# Patient Record
Sex: MALE | Race: WHITE | NOT HISPANIC OR LATINO | Employment: PART TIME | URBAN - NONMETROPOLITAN AREA
[De-identification: names, ages, dates, MRNs, and addresses within clinical notes are randomized per-mention and may not be internally consistent; named-entity substitution may affect disease eponyms.]

---

## 2017-01-18 DIAGNOSIS — I73.9 PVD (PERIPHERAL VASCULAR DISEASE) (HCC): Primary | ICD-10-CM

## 2017-01-19 ENCOUNTER — OFFICE VISIT (OUTPATIENT)
Dept: CARDIAC SURGERY | Facility: CLINIC | Age: 72
End: 2017-01-19

## 2017-01-19 VITALS
DIASTOLIC BLOOD PRESSURE: 60 MMHG | WEIGHT: 221 LBS | TEMPERATURE: 97.4 F | BODY MASS INDEX: 26.91 KG/M2 | OXYGEN SATURATION: 100 % | HEART RATE: 68 BPM | SYSTOLIC BLOOD PRESSURE: 150 MMHG | HEIGHT: 76 IN

## 2017-01-19 DIAGNOSIS — I65.23 BILATERAL CAROTID ARTERY STENOSIS: Primary | ICD-10-CM

## 2017-01-19 PROBLEM — I49.9 ARRHYTHMIA: Status: RESOLVED | Noted: 2017-01-19 | Resolved: 2017-01-19

## 2017-01-19 PROBLEM — E11.9 TYPE 2 DIABETES MELLITUS WITHOUT COMPLICATION (HCC): Status: ACTIVE | Noted: 2017-01-19

## 2017-01-19 PROBLEM — I49.9 ARRHYTHMIA: Status: ACTIVE | Noted: 2017-01-19

## 2017-01-19 PROBLEM — I25.10 CORONARY ARTERY DISEASE INVOLVING NATIVE CORONARY ARTERY WITHOUT ANGINA PECTORIS: Status: ACTIVE | Noted: 2017-01-19

## 2017-01-19 PROCEDURE — 99213 OFFICE O/P EST LOW 20 MIN: CPT | Performed by: NURSE PRACTITIONER

## 2017-01-19 RX ORDER — PIOGLITAZONEHYDROCHLORIDE 30 MG/1
30 TABLET ORAL DAILY
COMMUNITY
End: 2017-07-19 | Stop reason: ALTCHOICE

## 2017-01-19 RX ORDER — PROPAFENONE HYDROCHLORIDE 150 MG/1
150 TABLET, COATED ORAL 3 TIMES DAILY
COMMUNITY

## 2017-01-19 RX ORDER — PANTOPRAZOLE SODIUM 40 MG/1
40 TABLET, DELAYED RELEASE ORAL DAILY
COMMUNITY

## 2017-01-19 NOTE — MR AVS SNAPSHOT
Esteban Ferreira   1/19/2017 2:00 PM   Office Visit    Dept Phone:  [phone]   Encounter #:  [id number]    Provider:  LAM Stiles   Department:  [facility] CARDIOTHORACIC AND VASCULAR SURGERY                Your Full Care Plan              Today's Medication Changes          These changes are accurate as of: 1/19/17  2:48 PM.  If you have any questions, ask your nurse or doctor.               Stop taking medication(s)listed here:     amiodarone 200 MG tablet   Commonly known as:  PACERONE   Stopped by:  LAM Stiles           apixaban 5 MG tablet tablet   Commonly known as:  ELIQUIS   Stopped by:  LAM Stiles           digoxin 125 MCG tablet   Commonly known as:  LANOXIN   Stopped by:  LAM Stiles           losartan-hydrochlorothiazide 50-12.5 MG per tablet   Commonly known as:  HYZAAR   Stopped by:  LAM Stiles                      Your Updated Medication List          This list is accurate as of: 1/19/17  2:48 PM.  Always use your most recent med list.                aspirin 81 MG chewable tablet       diltiaZEM 120 MG tablet   Commonly known as:  CARDIZEM       glipiZIDE 10 MG tablet   Commonly known as:  GLUCOTROL       metFORMIN 500 MG tablet   Commonly known as:  GLUCOPHAGE       metoprolol succinate XL 25 MG 24 hr tablet   Commonly known as:  TOPROL-XL       pantoprazole 40 MG EC tablet   Commonly known as:  PROTONIX       pioglitazone 30 MG tablet   Commonly known as:  ACTOS       pravastatin 20 MG tablet   Commonly known as:  PRAVACHOL       propafenone 150 MG tablet   Commonly known as:  RHTHYMOL               You Were Diagnosed With        Codes Comments    Bilateral carotid artery stenosis    -  Primary ICD-10-CM: I65.23  ICD-9-CM: 433.10, 433.30       Instructions    Vascular Test:  Stable  Left neck artery blockage 50-79% by ratio and velocity 60% range  Meds that help Aspirin Pravastatin  Repeat in 6 mths  unless symptoms occur  If you should experience any neurological symptoms including but not limited to visual or speech disturbances confusion, seizures, or weakness of limbs of one side of your body notify Heart and Vascular center immediately for evaluation or if after hours present to the nearest Emergency Department.        Patient Instructions History      Upcoming Appointments     Visit Type Date Time Department    FOLLOW UP 2017  2:00 PM MGW CT VAS SURGERY [facility] VASCULAR VT 2017  1:30 PM MGW CT VAS SURGERY [facility]    OFFICE VISIT 2017  2:00 PM MGW CT VAS SURGERY [facility]      MyChart Signup     [facility] [employer] allows you to send messages to your doctor, view your test results, renew your prescriptions, schedule appointments, and more. To sign up, go to [employer] and click on the Sign Up Now link in the New User? box. Enter your [employer] Activation Code exactly as it appears below along with the last four digits of your Social Security Number and your Date of Birth () to complete the sign-up process. If you do not sign up before the expiration date, you must request a new code.    [employer] Activation Code: [email or web address]  Expires: 2017  2:32 PM    If you have questions, you can email [email or web address] or call [phone] to talk to our [employer] staff. Remember, [employer] is NOT to be used for urgent needs. For medical emergencies, dial 911.               Other Info from Your Visit           Your Appointments     2017  1:30 PM CDT   VASCULAR ULTRASOUND VISIT with NICKI [facility] RASHARD ROOM   [facility] CARDIOTHORACIC AND VASCULAR SURGERY (--)    [address] [place] [address] [address]  [address] [place] [id number]   [phone]            2017  2:00 PM CDT   Office Visit with LAM Stiles   [facility] CARDIOTHORACIC AND VASCULAR SURGERY (--)    [address] [place] [address] [address]  [facility] [address] [place] [address]  "[id number] [phone]           Arrive 15 minutes prior to appointment.              Allergies     Penicillin G  Rash      Reason for Visit     19 mo F/U Jony Carotid           Vital Signs     Blood Pressure Pulse Temperature Height Weight Oxygen Saturation    150/60 (BP Location: Left arm, Patient Position: Sitting) 68 97.4 °F (36.3 °C) (Oral) 76\" (193 cm) 221 lb (100 kg) 100%    Body Mass Index Smoking Status                26.9 kg/m2 Never Smoker          Problems and Diagnoses Noted     Narrowing of artery in neck    Coronary artery disease involving native coronary artery without angina pectoris    Type 2 diabetes mellitus without complication      No Longer an Issue     Abnormal heart rhythm        "

## 2017-01-19 NOTE — PATIENT INSTRUCTIONS
Vascular Test:  Stable  Left neck artery blockage 50-79% by ratio and velocity 60% range  Meds that help Aspirin Pravastatin  Repeat in 6 mths unless symptoms occur  If you should experience any neurological symptoms including but not limited to visual or speech disturbances confusion, seizures, or weakness of limbs of one side of your body notify Heart and Vascular center immediately for evaluation or if after hours present to the nearest Emergency Department.

## 2017-01-23 NOTE — PROGRESS NOTES
Subjective   Patient ID: Esteban Ferreira is a 71 y.o. male is here today for follow-up for carotid stenosis.  CC:  Some reduced stamina    History of Present Illness  Mr Ferreira is a gentleman with CAD who underwent CABG X 3.  He had an uneventful postop course except for a burst of at fib self converted and persistent nausea. He returns today in follow up.  Working and feels great. No visual changes, lightheadness or dizziness, or motor or sensory dysfunction.  Returns today in scheduled FU.     PCP:  Sunita  CARD:  Stacy     10/4/3  Carotid US:  LICA 50-69 %  10/7/13  CABG   6/5/14    Carotid US:  LISSA 0-15 %  LICA 50-79 % (ratio 2.3)  12/17/14  Carotid US:  LISSA 0-15 %  LICA 50-79 % (ratio 2.5)  6/18/15   Carotid US:  LISSA 0-15 %  LICA 50-79 % (ratio 2.4)  01/19/17  Carotid US:  LISSA 0-15 %  LICA 50-79 % ratio 1.7 maxPSV 142    The following portions of the patient's history were reviewed and updated as appropriate: allergies, current medications, past family history, past medical history, past social history, past surgical history and problem list.    Current Outpatient Prescriptions:   •  aspirin 81 MG chewable tablet, Chew 81 mg Daily., Disp: , Rfl:   •  diltiazem (CARDIZEM) 120 MG tablet, Take 120 mg by mouth Daily., Disp: , Rfl:   •  glipiZIDE (GLUCOTROL) 10 MG tablet, Take 10 mg by mouth Daily., Disp: , Rfl:   •  metFORMIN (GLUCOPHAGE) 500 MG tablet, Take 500 mg by mouth Daily., Disp: , Rfl:   •  metoprolol succinate XL (TOPROL-XL) 25 MG 24 hr tablet, Take 25 mg by mouth 2 (Two) Times a Day., Disp: , Rfl:   •  pantoprazole (PROTONIX) 40 MG EC tablet, Take 40 mg by mouth Daily., Disp: , Rfl:   •  pioglitazone (ACTOS) 30 MG tablet, Take 30 mg by mouth Daily., Disp: , Rfl:   •  pravastatin (PRAVACHOL) 20 MG tablet, Take 20 mg by mouth Every Night., Disp: , Rfl:   •  propafenone (RHTHYMOL) 150 MG tablet, Take 150 mg by mouth 3 (Three) Times a Day., Disp: , Rfl:     Review of Systems   Constitution: Negative  for decreased appetite, fever, weakness and malaise/fatigue.   HENT: Negative for headaches, hoarse voice and nosebleeds.    Eyes: Negative for visual disturbance.        No amaurosis fugax, TIA, or CVA.       Cardiovascular: Negative for chest pain, claudication, cyanosis and dyspnea on exertion.   Respiratory: Negative for cough and hemoptysis.    Hematologic/Lymphatic: Does not bruise/bleed easily.   Skin: Negative for color change, itching and rash.   Musculoskeletal: Negative for joint swelling, muscle cramps and muscle weakness.   Gastrointestinal: Negative for change in bowel habit, hematemesis and melena.   Genitourinary: Negative for hematuria.   Neurological: Negative for brief paralysis, difficulty with concentration, disturbances in coordination, dizziness, focal weakness, loss of balance, numbness and paresthesias.   Psychiatric/Behavioral: Negative for altered mental status.        Objective   Physical Exam   Constitutional: He is oriented to person, place, and time. He appears well-developed.   HENT:   Head: Normocephalic.   Eyes: Conjunctivae and EOM are normal. Pupils are equal, round, and reactive to light.   Neck: Neck supple. No JVD present. No tracheal deviation present.   Cardiovascular: Normal rate, regular rhythm, normal heart sounds and intact distal pulses.    Pulmonary/Chest: Effort normal and breath sounds normal.   Musculoskeletal: He exhibits no edema or deformity.   Neurological: He is alert and oriented to person, place, and time. No cranial nerve deficit.   Skin: Skin is warm and dry. No erythema. No pallor.   Nursing note and vitals reviewed.      Assessment/Plan   Independent Review of Radiographic Studies:    01/19/17  Carotid US:  LISSA 0-15 %  LICA 50-79 % ratio 1.7 maxPSV 142    1. Bilateral carotid artery stenosis  Stable.  Esteban MORENO Ferreira is to continue beta blocker, antiplatelet, and statin therapy.   Monitor BP at home  Meds have changed by PCP and cardiologist.    Continue  exercise.   - Duplex Carotid Ultrasound CAR; Future

## 2017-07-19 ENCOUNTER — OFFICE VISIT (OUTPATIENT)
Dept: CARDIAC SURGERY | Facility: CLINIC | Age: 72
End: 2017-07-19

## 2017-07-19 VITALS
BODY MASS INDEX: 27.28 KG/M2 | OXYGEN SATURATION: 99 % | HEART RATE: 69 BPM | DIASTOLIC BLOOD PRESSURE: 62 MMHG | WEIGHT: 224 LBS | TEMPERATURE: 96.8 F | HEIGHT: 76 IN | SYSTOLIC BLOOD PRESSURE: 118 MMHG

## 2017-07-19 DIAGNOSIS — I65.23 BILATERAL CAROTID ARTERY STENOSIS: Primary | ICD-10-CM

## 2017-07-19 DIAGNOSIS — E55.9 VITAMIN D DEFICIENCY: ICD-10-CM

## 2017-07-19 PROCEDURE — 99213 OFFICE O/P EST LOW 20 MIN: CPT | Performed by: NURSE PRACTITIONER

## 2017-07-19 RX ORDER — ONDANSETRON 4 MG/1
4 TABLET, FILM COATED ORAL EVERY 8 HOURS PRN
COMMUNITY

## 2017-07-19 NOTE — PATIENT INSTRUCTIONS
Carotid US:  Right 0-49% velocity low                       LEFT 50-69% velocity same   Medications that reduce vascular disease:  Aspirin pravastatin  2nd Toprol xl   Will check VIT D as VIT D deficiency causes inflammation of lining of arteries.

## 2017-07-21 NOTE — PROGRESS NOTES
Subjective   Patient ID: Esteban Ferreira is a 71 y.o. male is here today for follow-up for carotid stenosis.  CC:  Some reduced stamina    Carotid Artery Disease   Pertinent negatives include no abdominal pain, change in bowel habit, chest pain, coughing, fever, headaches, joint swelling, nausea, numbness, rash or weakness.     Mr Ferreira is a gentleman with CAD who underwent CABG X 3.  He had an uneventful postop course except for a burst of at fib self converted. He returns today in follow up.  Working and feels great. No visual changes, lightheadness or dizziness, or motor or sensory dysfunction.  Has healthy lifestyle.  Returns today in scheduled FU.     PCP:  Sunita  CARD:  Stacy     10/4/3  Carotid US:  LICA 50-69 %  10/7/13  CABG   6/5/14    Carotid US:  LISSA 0-15 %  LICA 50-79 % (ratio 2.3)  12/17/14  Carotid US:  LISSA 0-15 %  LICA 50-79 % (ratio 2.5)  6/18/15   Carotid US:  LISSA 0-15 %  LICA 50-79 % (ratio 2.4)  01/19/17  Carotid US:  LISSA 0-15 %  LICA 50-79 % ratio 1.7 maxPSV 142  07/19/17  Carotid US:  LISSA 0-49 %  LICA 50-69 % ratio 2.7 maxPSV 147        The following portions of the patient's history were reviewed and updated as appropriate: allergies, current medications, past family history, past medical history, past social history, past surgical history and problem list.    Current Outpatient Prescriptions:   •  aspirin 81 MG chewable tablet, Chew 81 mg Daily., Disp: , Rfl:   •  Canagliflozin (INVOKANA) 300 MG tablet, Take 300 mg by mouth., Disp: , Rfl:   •  Cyanocobalamin (VITAMIN B 12 PO), Take  by mouth., Disp: , Rfl:   •  diltiazem (CARDIZEM) 120 MG tablet, Take 120 mg by mouth Daily., Disp: , Rfl:   •  glipiZIDE (GLUCOTROL) 10 MG tablet, Take 10 mg by mouth Daily., Disp: , Rfl:   •  metFORMIN (GLUCOPHAGE) 500 MG tablet, Take 500 mg by mouth Daily., Disp: , Rfl:   •  metoprolol succinate XL (TOPROL-XL) 25 MG 24 hr tablet, Take 25 mg by mouth 2 (Two) Times a Day., Disp: , Rfl:   •   ondansetron (ZOFRAN) 4 MG tablet, Take 4 mg by mouth Every 8 (Eight) Hours As Needed for Nausea or Vomiting., Disp: , Rfl:   •  pantoprazole (PROTONIX) 40 MG EC tablet, Take 40 mg by mouth Daily., Disp: , Rfl:   •  pravastatin (PRAVACHOL) 20 MG tablet, Take 20 mg by mouth Every Night., Disp: , Rfl:   •  propafenone (RHTHYMOL) 150 MG tablet, Take 150 mg by mouth 3 (Three) Times a Day., Disp: , Rfl:     Review of Systems   Constitution: Negative for decreased appetite, fever, weakness and malaise/fatigue.   HENT: Negative for headaches, hoarse voice and nosebleeds.    Eyes: Negative for visual disturbance.        No amaurosis fugax, TIA, or CVA.       Cardiovascular: Negative for chest pain, claudication, cyanosis and dyspnea on exertion.   Respiratory: Negative for cough, hemoptysis and shortness of breath.    Hematologic/Lymphatic: Does not bruise/bleed easily.   Skin: Negative for color change, itching and rash.   Musculoskeletal: Negative for joint swelling, muscle cramps and muscle weakness.   Gastrointestinal: Negative for abdominal pain, change in bowel habit, hematemesis, melena and nausea.   Genitourinary: Negative for hematuria.   Neurological: Negative for brief paralysis, difficulty with concentration, disturbances in coordination, dizziness, focal weakness, loss of balance, numbness and paresthesias.   Psychiatric/Behavioral: Negative for altered mental status.        Objective   Physical Exam   Constitutional: He is oriented to person, place, and time. He appears well-developed.   HENT:   Head: Normocephalic.   Mouth/Throat: Oropharynx is clear and moist.   Eyes: Conjunctivae and EOM are normal. Pupils are equal, round, and reactive to light.   Neck: Neck supple. No JVD present. No tracheal deviation present.   Cardiovascular: Normal rate, regular rhythm, normal heart sounds and intact distal pulses.    Pulmonary/Chest: Effort normal and breath sounds normal.   Abdominal: Soft. Bowel sounds are normal.    Musculoskeletal: He exhibits no edema or deformity.   Neurological: He is alert and oriented to person, place, and time. No cranial nerve deficit.   Skin: Skin is warm and dry. No rash noted. No erythema. No pallor.   Psychiatric: Judgment normal.   Nursing note and vitals reviewed.      Assessment/Plan   Independent Review of Radiographic Studies:    07/19/17  Carotid US:  LISSA 0-49 %  LICA 50-69 % ratio 2.7 maxPSV 147    1. Bilateral carotid artery stenosis  Stable.  Esteban Ferreira is to continue beta blocker, antiplatelet, and statin therapy.   Will check VIT D as VIT D deficiency causes inflammation of lining of arteries.    Continue exercise.   - Duplex Carotid Ultrasound CAR; Future

## 2018-05-03 ENCOUNTER — OFFICE VISIT (OUTPATIENT)
Dept: CARDIAC SURGERY | Facility: CLINIC | Age: 73
End: 2018-05-03

## 2018-05-03 VITALS
DIASTOLIC BLOOD PRESSURE: 68 MMHG | BODY MASS INDEX: 26.55 KG/M2 | HEART RATE: 69 BPM | OXYGEN SATURATION: 99 % | SYSTOLIC BLOOD PRESSURE: 122 MMHG | HEIGHT: 76 IN | WEIGHT: 218 LBS | TEMPERATURE: 98.8 F

## 2018-05-03 DIAGNOSIS — I65.23 BILATERAL CAROTID ARTERY STENOSIS: Primary | ICD-10-CM

## 2018-05-03 PROCEDURE — 99213 OFFICE O/P EST LOW 20 MIN: CPT | Performed by: NURSE PRACTITIONER

## 2018-05-03 NOTE — PATIENT INSTRUCTIONS
Carotid Duplex:  Right 0-49%  Left 50-69%  Stable  Velocity stable  Recommend continue medical management with antiplatelet therapy, and statin therapy.  Secondary BB  Vitamin D has been shown to promote healthy lining of arteries.  Check level and replace as needed.   Recommend healthy life style:   Continue not smoking. Walking total of 45 minutes per day in minimum of 15 minute intervals   Avoid  Going  barefoot.  Non perfumed cream for for dry skin    Return in 6 months unless  If you should experience any neurological symptoms including but not limited to visual or speech disturbances confusion, seizures, or weakness of limbs of one side of your body notify Heart and Vascular center immediately for evaluation or if after hours present to the nearest Emergency Department.

## 2018-05-11 NOTE — PROGRESS NOTES
Subjective   Patient ID: Esteban Ferreira is a 72 y.o. male is here today for follow-up for carotid stenosis.  CC:  Denies any neurosensory symptoms  No amaurosis fugax, TIA, or CVA.        Carotid Artery Disease   Pertinent negatives include no abdominal pain, change in bowel habit, chest pain, coughing, fever, headaches, joint swelling, nausea, numbness, rash or weakness.     Mr Ferreira is a gentleman with CAD who underwent CABG X 3.  He had an uneventful postop course except for a burst of at fib self converted. He returns today in follow up.  Working and feels great. No visual changes, lightheadness or dizziness, or motor or sensory dysfunction.  Has healthy lifestyle.  Returns today in scheduled FU.  He continues to work.     PCP:  Sunita  CARD:  Stacy     10/4/3  Carotid US:  LICA 50-69 %  10/7/13  CABG   6/5/14    Carotid US:  LISSA 0-15 %  LICA 50-79 % (ratio 2.3)  12/17/14  Carotid US:  LISSA 0-15 %  LICA 50-79 % (ratio 2.5)  6/18/15   Carotid US:  LISSA 0-15 %  LICA 50-79 % (ratio 2.4)  01/19/17  Carotid US:  LISSA 0-15 %  LICA 50-79 % ratio 1.7 maxPSV 142  07/19/17  Carotid US:  LISSA 0-49 %  LICA 50-69 % ratio 2.7 maxPSV 147  05/03/18   Carotid US:  LISSA 0-49 %  LICA 50-69 % ratio 2.7 maxPSV 168          The following portions of the patient's history were reviewed and updated as appropriate: allergies, current medications, past family history, past medical history, past social history, past surgical history and problem list.  Allergies   Allergen Reactions   • Ace Inhibitors Cough   • Penicillin G Rash         Current Outpatient Prescriptions:   •  aspirin 81 MG chewable tablet, Chew 81 mg Daily., Disp: , Rfl:   •  Canagliflozin (INVOKANA) 300 MG tablet, Take 300 mg by mouth., Disp: , Rfl:   •  Cyanocobalamin (VITAMIN B 12 PO), Take  by mouth., Disp: , Rfl:   •  diltiazem (CARDIZEM) 120 MG tablet, Take 120 mg by mouth Daily., Disp: , Rfl:   •  glipiZIDE (GLUCOTROL) 10 MG tablet, Take 10 mg by mouth  Daily., Disp: , Rfl:   •  metFORMIN (GLUCOPHAGE) 500 MG tablet, Take 500 mg by mouth Daily., Disp: , Rfl:   •  metoprolol succinate XL (TOPROL-XL) 25 MG 24 hr tablet, Take 25 mg by mouth 2 (Two) Times a Day., Disp: , Rfl:   •  ondansetron (ZOFRAN) 4 MG tablet, Take 4 mg by mouth Every 8 (Eight) Hours As Needed for Nausea or Vomiting., Disp: , Rfl:   •  pantoprazole (PROTONIX) 40 MG EC tablet, Take 40 mg by mouth Daily., Disp: , Rfl:   •  pravastatin (PRAVACHOL) 20 MG tablet, Take 20 mg by mouth Every Night., Disp: , Rfl:   •  propafenone (RHTHYMOL) 150 MG tablet, Take 150 mg by mouth 3 (Three) Times a Day., Disp: , Rfl:     Review of Systems   Constitution: Negative for decreased appetite, fever, weakness and malaise/fatigue.   HENT: Negative for hoarse voice and nosebleeds.         No ear rhythmic pulsation     Eyes: Negative for visual disturbance.        No amaurosis fugax, TIA, or CVA.       Cardiovascular: Negative for chest pain, claudication, cyanosis, dyspnea on exertion and syncope.   Respiratory: Negative for cough, hemoptysis and shortness of breath.    Hematologic/Lymphatic: Does not bruise/bleed easily.   Skin: Negative for color change, itching and rash.   Musculoskeletal: Negative for joint swelling, muscle cramps and muscle weakness.   Gastrointestinal: Negative for abdominal pain, change in bowel habit, hematemesis, melena and nausea.   Genitourinary: Negative for hematuria.   Neurological: Negative for brief paralysis, difficulty with concentration, disturbances in coordination, dizziness, focal weakness, headaches, loss of balance, numbness and paresthesias.   Psychiatric/Behavioral: Negative for altered mental status and memory loss.   Allergic/Immunologic: Negative for hives.        Objective   Physical Exam   Constitutional: He is oriented to person, place, and time. He appears well-developed. No distress.   Body mass index is 26.54 kg/m².     HENT:   Head: Normocephalic.   Mouth/Throat:  Oropharynx is clear and moist.   Tongue midline Facial movements symmetrical   Sensation intact.      Eyes: Conjunctivae and EOM are normal. Pupils are equal, round, and reactive to light.   Neck: Neck supple. No JVD present. No tracheal deviation present.   Cardiovascular: Normal rate, regular rhythm, normal heart sounds and intact distal pulses.    Pulmonary/Chest: Effort normal and breath sounds normal.   Abdominal: Soft. Bowel sounds are normal.   Musculoskeletal: He exhibits no edema or deformity.   Symmetrical = movements      Neurological: He is alert and oriented to person, place, and time. No cranial nerve deficit.   Skin: Skin is warm and dry. Capillary refill takes less than 2 seconds. No rash noted. No erythema. No pallor.   Psychiatric: His behavior is normal. Judgment normal.   Nursing note and vitals reviewed.      Assessment/Plan   Independent Review of Radiographic Studies:    05/03/18   Carotid US:  LISSA 0-49 %  LICA 50-69 % ratio 2.7 maxPSV 168    1. Bilateral carotid artery stenosis  Stable.  Esteban Ferreira is to continue beta blocker, antiplatelet, and statin therapy.   Continue healthy lifestyle:  Not smoking  Exercise  Heart healthy consistent carbohydrate diet.   Recommend continue medical management with antiplatelet therapy, and statin therapy.  Secondary BB  Vitamin D has been shown to promote healthy lining of arteries.  Check level and replace as needed.   Recommend healthy life style:   Continue not smoking. Walking total of 45 minutes per day in minimum of 15 minute intervals   Avoid  Going  barefoot.  Non perfumed cream for for dry skin    Return in 6 months unless  If you should experience any neurological symptoms including but not limited to visual or speech disturbances confusion, seizures, or weakness of limbs of one side of your body notify Heart and Vascular center immediately for evaluation or if after hours present to the nearest Emergency Department.     - Duplex Carotid  Ultrasound CAR; Future    2. Overweight  Patient's Body mass index is 26.54 kg/m². BMI is above normal parameters. Recommendations include: exercise counseling.

## 2018-11-26 ENCOUNTER — OFFICE VISIT (OUTPATIENT)
Dept: CARDIAC SURGERY | Facility: CLINIC | Age: 73
End: 2018-11-26

## 2018-11-26 VITALS
OXYGEN SATURATION: 98 % | SYSTOLIC BLOOD PRESSURE: 130 MMHG | WEIGHT: 219.4 LBS | HEIGHT: 76 IN | HEART RATE: 74 BPM | DIASTOLIC BLOOD PRESSURE: 68 MMHG | TEMPERATURE: 98.1 F | BODY MASS INDEX: 26.72 KG/M2

## 2018-11-26 DIAGNOSIS — I65.23 BILATERAL CAROTID ARTERY STENOSIS: ICD-10-CM

## 2018-11-26 PROCEDURE — 99213 OFFICE O/P EST LOW 20 MIN: CPT | Performed by: NURSE PRACTITIONER

## 2018-12-06 NOTE — PROGRESS NOTES
Subjective   Patient ID: Esteban Ferreira is a 73 y.o. male is here today for follow-up for carotid stenosis.  CC:  Denies any neurosensory symptoms  No amaurosis fugax, TIA, or CVA.        Carotid Artery Disease   Pertinent negatives include no abdominal pain, change in bowel habit, chest pain, coughing, fever, headaches, joint swelling, nausea, numbness, rash or weakness.     Mr Ferreira is a gentleman with CAD who underwent CABG X 3.  He had an uneventful postop course except for a burst of at fib self converted. He returns today in follow up.  Working and feels great. No visual changes, lightheadness or dizziness, or motor or sensory dysfunction.  Has healthy lifestyle.  Returns today in scheduled FU.  He continues to work.     PCP:  Sunita  CARD:  Stacy     10/4/3  Carotid US:  LICA 50-69 %  10/7/13  CABG   6/5/14    Carotid US:  LISSA 0-15 %  LICA 50-79 % (ratio 2.3)  12/17/14  Carotid US:  LISSA 0-15 %  LICA 50-79 % (ratio 2.5)  6/18/15   Carotid US:  LISSA 0-15 %  LICA 50-79 % (ratio 2.4)  01/19/17  Carotid US:  LISSA 0-15 %  LICA 50-79 % ratio 1.7 maxPSV 142  07/19/17  Carotid US:  LISSA 0-49 %  LICA 50-69 % ratio 2.7 maxPSV 147  05/03/18   Carotid US:  LISSA 0-49 %  LICA 50-69 % ratio 2.7 maxPSV 168  11/26/18   Carotid US:  LISSA 0-49 % ratio 2.6 maxPSV 81 V antegrade LICA 50-69 % ratio 1.7 maxPSV 137 V antegrade          The following portions of the patient's history were reviewed and updated as appropriate: allergies, current medications, past family history, past medical history, past social history, past surgical history and problem list.  Allergies   Allergen Reactions   • Ace Inhibitors Cough   • Penicillin G Rash         Current Outpatient Medications:   •  aspirin 81 MG chewable tablet, Chew 81 mg Daily., Disp: , Rfl:   •  Cyanocobalamin (VITAMIN B 12 PO), Take  by mouth., Disp: , Rfl:   •  diltiazem (CARDIZEM) 120 MG tablet, Take 120 mg by mouth Daily., Disp: , Rfl:   •  glipiZIDE (GLUCOTROL) 10 MG  tablet, Take 10 mg by mouth Daily., Disp: , Rfl:   •  metFORMIN (GLUCOPHAGE) 500 MG tablet, Take 500 mg by mouth 3 (Three) Times a Day., Disp: , Rfl:   •  metoprolol succinate XL (TOPROL-XL) 25 MG 24 hr tablet, Take 25 mg by mouth 2 (Two) Times a Day., Disp: , Rfl:   •  ondansetron (ZOFRAN) 4 MG tablet, Take 4 mg by mouth Every 8 (Eight) Hours As Needed for Nausea or Vomiting., Disp: , Rfl:   •  pantoprazole (PROTONIX) 40 MG EC tablet, Take 40 mg by mouth Daily., Disp: , Rfl:   •  pravastatin (PRAVACHOL) 20 MG tablet, Take 20 mg by mouth Every Night., Disp: , Rfl:   •  propafenone (RHTHYMOL) 150 MG tablet, Take 150 mg by mouth 3 (Three) Times a Day., Disp: , Rfl:   •  Canagliflozin (INVOKANA) 300 MG tablet, Take 300 mg by mouth., Disp: , Rfl:     Review of Systems   Constitution: Negative for decreased appetite, fever, weakness and malaise/fatigue.   HENT: Negative for hoarse voice and nosebleeds.         No ear rhythmic pulsation     Eyes: Negative for visual disturbance.        No amaurosis fugax, TIA, or CVA.       Cardiovascular: Negative for chest pain, claudication, cyanosis, dyspnea on exertion and syncope.   Respiratory: Negative for cough, hemoptysis and shortness of breath.    Hematologic/Lymphatic: Does not bruise/bleed easily.   Skin: Negative for color change, itching and rash.   Musculoskeletal: Negative for joint swelling, muscle cramps and muscle weakness.   Gastrointestinal: Negative for abdominal pain, change in bowel habit, hematemesis, melena and nausea.   Genitourinary: Negative for hematuria.   Neurological: Negative for brief paralysis, difficulty with concentration, disturbances in coordination, dizziness, focal weakness, headaches, loss of balance, numbness and paresthesias.             Denies any amaurosis fugae, paresthesias, paralysis, change in speech, or neurosensory symptoms.  Denies any rhythmic pulsation in ears   Psychiatric/Behavioral: Negative for altered mental status and memory  loss.   Allergic/Immunologic: Negative for hives.        Objective   Physical Exam   Constitutional: He is oriented to person, place, and time. He appears well-developed. No distress.   Body mass index is 26.7 kg/m².     HENT:   Head: Normocephalic.   Mouth/Throat: Oropharynx is clear and moist.   Tongue midline Facial movements symmetrical   Sensation intact.      Eyes: Conjunctivae and EOM are normal. Pupils are equal, round, and reactive to light.   Neck: Neck supple. No JVD present. No tracheal deviation present.   Cardiovascular: Normal rate, regular rhythm, normal heart sounds and intact distal pulses.   Pulmonary/Chest: Effort normal and breath sounds normal.   Abdominal: Soft. Bowel sounds are normal.   Musculoskeletal: He exhibits no edema or deformity.   Symmetrical = movements      Neurological: He is alert and oriented to person, place, and time. No cranial nerve deficit or sensory deficit. He exhibits normal muscle tone. Coordination normal.   Skin: Skin is warm and dry. Capillary refill takes less than 2 seconds. No rash noted. No erythema. No pallor.   Psychiatric: His behavior is normal. Judgment normal.   Nursing note and vitals reviewed.      Assessment/Plan   Independent Review of Radiographic Studies:    11/26/18   Carotid US:  LISSA 0-49 % ratio 2.6 maxPSV 81 V antegrade LICA 50-69 % ratio 1.7 maxPSV 137 V antegrade    1. Bilateral carotid artery stenosis  Stable mild to moderate ,disease.  Recommend medical management with risk factor modification.     Esteban Ferreira is to continue beta blocker, antiplatelet, and statin therapy.   Continue healthy lifestyle:  Not smoking  Exercise  Heart healthy consistent carbohydrate diet.   Recommend continue medical management with antiplatelet therapy, and statin therapy.  Secondary BB  Vitamin D has been shown to promote healthy lining of arteries.  Check level and replace as needed.   Recommend healthy life style:   Continue not smoking. Walking total of  45 minutes per day in minimum of 15 minute intervals   Avoid  Going  barefoot.  Non perfumed cream for for dry skin    Return in 6 months unless  If you should experience any neurological symptoms including but not limited to visual or speech disturbances confusion, seizures, or weakness of limbs of one side of your body notify [facility] V[facility] center immediately for evaluation or if after hours present to the nearest Emergency Department.     - Duplex Carotid Ultrasound CAR; Future    2. Overweight  Patient's Body mass index is 26.71 kg/m². BMI is above normal parameters. Recommendations include: exercise counseling.  Early am walking

## 2019-07-07 ENCOUNTER — HOSPITAL ENCOUNTER (EMERGENCY)
Facility: HOSPITAL | Age: 74
Discharge: HOME | End: 2019-07-07
Admitting: FAMILY MEDICINE

## 2019-07-07 ENCOUNTER — APPOINTMENT (OUTPATIENT)
Dept: ULTRASOUND IMAGING | Facility: HOSPITAL | Age: 74
End: 2019-07-07

## 2019-07-07 VITALS
OXYGEN SATURATION: 97 % | RESPIRATION RATE: 18 BRPM | BODY MASS INDEX: 26.79 KG/M2 | DIASTOLIC BLOOD PRESSURE: 67 MMHG | HEIGHT: 76 IN | SYSTOLIC BLOOD PRESSURE: 139 MMHG | WEIGHT: 220 LBS | TEMPERATURE: 98 F | HEART RATE: 72 BPM

## 2019-07-07 DIAGNOSIS — R79.89 ELEVATED LFTS: Primary | ICD-10-CM

## 2019-07-07 LAB
ALBUMIN SERPL-MCNC: 3.6 G/DL (ref 3.5–5.2)
ALBUMIN/GLOB SERPL: 1 G/DL
ALP SERPL-CCNC: 193 U/L (ref 39–117)
ALT SERPL W P-5'-P-CCNC: 424 U/L (ref 1–41)
ANION GAP SERPL CALCULATED.3IONS-SCNC: 15 MMOL/L (ref 5–15)
AST SERPL-CCNC: 493 U/L (ref 1–40)
BASOPHILS # BLD AUTO: 0.06 10*3/MM3 (ref 0–0.2)
BASOPHILS NFR BLD AUTO: 0.6 % (ref 0–1.5)
BILIRUB SERPL-MCNC: 1.5 MG/DL (ref 0.2–1.2)
BUN BLD-MCNC: 32 MG/DL (ref 8–23)
BUN/CREAT SERPL: 23.4 (ref 7–25)
CALCIUM SPEC-SCNC: 9.1 MG/DL (ref 8.6–10.5)
CHLORIDE SERPL-SCNC: 100 MMOL/L (ref 98–107)
CO2 SERPL-SCNC: 22 MMOL/L (ref 22–29)
CREAT BLD-MCNC: 1.37 MG/DL (ref 0.76–1.27)
DEPRECATED RDW RBC AUTO: 39.8 FL (ref 37–54)
EOSINOPHIL # BLD AUTO: 0.05 10*3/MM3 (ref 0–0.4)
EOSINOPHIL NFR BLD AUTO: 0.5 % (ref 0.3–6.2)
ERYTHROCYTE [DISTWIDTH] IN BLOOD BY AUTOMATED COUNT: 13 % (ref 12.3–15.4)
GFR SERPL CREATININE-BSD FRML MDRD: 51 ML/MIN/1.73
GLOBULIN UR ELPH-MCNC: 3.5 GM/DL
GLUCOSE BLD-MCNC: 154 MG/DL (ref 65–99)
HAV IGM SERPL QL IA: NORMAL
HBV CORE IGM SERPL QL IA: NORMAL
HBV SURFACE AG SERPL QL IA: NORMAL
HCT VFR BLD AUTO: 42.9 % (ref 37.5–51)
HCV AB SER DONR QL: NORMAL
HGB BLD-MCNC: 14.1 G/DL (ref 13–17.7)
IMM GRANULOCYTES # BLD AUTO: 0.08 10*3/MM3 (ref 0–0.05)
IMM GRANULOCYTES NFR BLD AUTO: 0.8 % (ref 0–0.5)
LIPASE SERPL-CCNC: 16 U/L (ref 13–60)
LYMPHOCYTES # BLD AUTO: 0.88 10*3/MM3 (ref 0.7–3.1)
LYMPHOCYTES NFR BLD AUTO: 8.5 % (ref 19.6–45.3)
MCH RBC QN AUTO: 27.8 PG (ref 26.6–33)
MCHC RBC AUTO-ENTMCNC: 32.9 G/DL (ref 31.5–35.7)
MCV RBC AUTO: 84.4 FL (ref 79–97)
MONOCYTES # BLD AUTO: 0.74 10*3/MM3 (ref 0.1–0.9)
MONOCYTES NFR BLD AUTO: 7.2 % (ref 5–12)
NEUTROPHILS # BLD AUTO: 8.53 10*3/MM3 (ref 1.7–7)
NEUTROPHILS NFR BLD AUTO: 82.4 % (ref 42.7–76)
NRBC BLD AUTO-RTO: 0 /100 WBC (ref 0–0.2)
PLATELET # BLD AUTO: 206 10*3/MM3 (ref 140–450)
PMV BLD AUTO: 9.2 FL (ref 6–12)
POTASSIUM BLD-SCNC: 4.5 MMOL/L (ref 3.5–5.2)
PROT SERPL-MCNC: 7.1 G/DL (ref 6–8.5)
RBC # BLD AUTO: 5.08 10*6/MM3 (ref 4.14–5.8)
SODIUM BLD-SCNC: 137 MMOL/L (ref 136–145)
TROPONIN T SERPL-MCNC: <0.01 NG/ML (ref 0–0.03)
WBC NRBC COR # BLD: 10.34 10*3/MM3 (ref 3.4–10.8)

## 2019-07-07 PROCEDURE — 85025 COMPLETE CBC W/AUTO DIFF WBC: CPT | Performed by: STUDENT IN AN ORGANIZED HEALTH CARE EDUCATION/TRAINING PROGRAM

## 2019-07-07 PROCEDURE — 80074 ACUTE HEPATITIS PANEL: CPT | Performed by: STUDENT IN AN ORGANIZED HEALTH CARE EDUCATION/TRAINING PROGRAM

## 2019-07-07 PROCEDURE — 76705 ECHO EXAM OF ABDOMEN: CPT

## 2019-07-07 PROCEDURE — 83690 ASSAY OF LIPASE: CPT | Performed by: STUDENT IN AN ORGANIZED HEALTH CARE EDUCATION/TRAINING PROGRAM

## 2019-07-07 PROCEDURE — 84484 ASSAY OF TROPONIN QUANT: CPT | Performed by: STUDENT IN AN ORGANIZED HEALTH CARE EDUCATION/TRAINING PROGRAM

## 2019-07-07 PROCEDURE — 99283 EMERGENCY DEPT VISIT LOW MDM: CPT

## 2019-07-07 PROCEDURE — 80053 COMPREHEN METABOLIC PANEL: CPT | Performed by: STUDENT IN AN ORGANIZED HEALTH CARE EDUCATION/TRAINING PROGRAM

## 2019-07-07 RX ADMIN — SODIUM CHLORIDE, POTASSIUM CHLORIDE, SODIUM LACTATE AND CALCIUM CHLORIDE 1000 ML: 600; 310; 30; 20 INJECTION, SOLUTION INTRAVENOUS at 17:40

## 2019-07-07 NOTE — ED PROVIDER NOTES
Subjective   To the ER of after being sent here from urgent care with  Abnormal labs.  2 days ago the patient ate barbecue at a party that was held at work.  The next day he had chili.  Last night he started getting some rumblings in his belly with cramping.  This morning he was nauseated and had several episodes of vomiting.  He went to urgent care to get evaluated and was found to have elevated LFTs and was referred to the ER for further evaluation.            Review of Systems   Constitutional: Positive for appetite change. Negative for chills, diaphoresis, fatigue and fever.   HENT: Negative for congestion and rhinorrhea.    Eyes: Negative for discharge and redness.   Respiratory: Negative for cough, chest tightness, shortness of breath and wheezing.    Cardiovascular: Negative for chest pain, palpitations and leg swelling.   Gastrointestinal: Positive for abdominal pain, nausea and vomiting. Negative for constipation and diarrhea.   Genitourinary: Negative for dysuria and flank pain.   Skin: Negative for color change and rash.   Neurological: Negative for dizziness and headaches.   Psychiatric/Behavioral: Negative for agitation and confusion. The patient is not nervous/anxious.        Past Medical History:   Diagnosis Date   • Anemia      chronic with acute blood loss      • Carotid artery stenosis      LISSA 0-15 % LICA 50-79 %   • Coronary arteriosclerosis    • Dyslipidemia    • Essential hypertension    • Surgical follow-up care    • Type 2 diabetes mellitus (CMS/HCC)        Allergies   Allergen Reactions   • Ace Inhibitors Cough   • Penicillin G Rash       Past Surgical History:   Procedure Laterality Date   • BREAST BIOPSY  1996    Left benign   • CARDIAC CATHETERIZATION  10/04/2013    Proximal LAD artery with 9-95% stenosis which is probably the culprit vessel. First obtuse margional branch with 70-80% stenosis. Diagonal branch #1 at it origin at the ostium with 60-70% stenosis. Anteroapical wall  "hypokinesis with EF 45-50%.   • CORONARY ARTERY BYPASS GRAFT  10/07/2013    X 3, LIMA to LAD, SVG to OMB, and SVG to PDA. Endo vein harvesting   • TRANSESOPHAGEAL ECHOCARDIOGRAM (SHAKIRA)  01/29/2016    with color flow-Mild CLVH with mild left atrial enlargement.LV systolic function well preserved with Ef of 50-55%.Evidence of mild inferior wall hypokinesis.Mitral valve mildly thickened.AV thickened.Tricuspid intact.Mild mitral and tricuspid regurg       Family History   Problem Relation Age of Onset   • Diabetes Mother    • Coronary artery disease Father    • Heart disease Father    • Sudden death Father    • Coronary artery disease Brother    • Heart disease Brother    • Coronary artery disease Paternal Grandmother    • Heart disease Paternal Grandmother        Social History     Socioeconomic History   • Marital status:      Spouse name: Not on file   • Number of children: Not on file   • Years of education: Not on file   • Highest education level: Not on file   Tobacco Use   • Smoking status: Never Smoker   • Smokeless tobacco: Never Used   Substance and Sexual Activity   • Alcohol use: No   • Drug use: No           Objective    Vitals:    07/07/19 1632 07/07/19 1758   BP: 133/69 136/64   BP Location: Right arm Left arm   Patient Position: Sitting Lying   Pulse: 81 66   Resp: 18 18   Temp: 98 °F (36.7 °C)    TempSrc: Oral    SpO2: 97% 94%   Weight: 99.8 kg (220 lb)    Height: 193 cm (76\")      Physical Exam   Constitutional: He is oriented to person, place, and time. He appears well-developed and well-nourished. He is active.  Non-toxic appearance. He does not have a sickly appearance. He does not appear ill. No distress.   HENT:   Head: Normocephalic.   Right Ear: External ear normal.   Left Ear: External ear normal.   Nose: No mucosal edema or rhinorrhea.   Mouth/Throat: Uvula is midline and mucous membranes are normal.   Eyes: Conjunctivae are normal. No scleral icterus.   Cardiovascular: Intact distal " pulses.   Pulmonary/Chest: Effort normal and breath sounds normal. No accessory muscle usage. No respiratory distress. He has no decreased breath sounds. He has no wheezes. He exhibits no tenderness.   Abdominal: Soft. Bowel sounds are normal. There is no tenderness (deep palpation). There is no rigidity, no rebound and no guarding.   Neurological: He is alert and oriented to person, place, and time. He is not disoriented. No cranial nerve deficit (grossly intact). GCS eye subscore is 4. GCS verbal subscore is 5. GCS motor subscore is 6.   Skin: Skin is warm and dry. Capillary refill takes less than 2 seconds. He is not diaphoretic.   Nursing note and vitals reviewed.      Procedures           ED Course      Results for orders placed or performed during the hospital encounter of 07/07/19   Comprehensive Metabolic Panel   Result Value Ref Range    Glucose 154 (H) 65 - 99 mg/dL    BUN 32 (H) 8 - 23 mg/dL    Creatinine 1.37 (H) 0.76 - 1.27 mg/dL    Sodium 137 136 - 145 mmol/L    Potassium 4.5 3.5 - 5.2 mmol/L    Chloride 100 98 - 107 mmol/L    CO2 22.0 22.0 - 29.0 mmol/L    Calcium 9.1 8.6 - 10.5 mg/dL    Total Protein 7.1 6.0 - 8.5 g/dL    Albumin 3.60 3.50 - 5.20 g/dL    ALT (SGPT) 424 (H) 1 - 41 U/L    AST (SGOT) 493 (H) 1 - 40 U/L    Alkaline Phosphatase 193 (H) 39 - 117 U/L    Total Bilirubin 1.5 (H) 0.2 - 1.2 mg/dL    eGFR Non African Amer 51 (L) >60 mL/min/1.73    Globulin 3.5 gm/dL    A/G Ratio 1.0 g/dL    BUN/Creatinine Ratio 23.4 7.0 - 25.0    Anion Gap 15.0 5.0 - 15.0 mmol/L   Lipase   Result Value Ref Range    Lipase 16 13 - 60 U/L   Troponin   Result Value Ref Range    Troponin T <0.010 0.000 - 0.030 ng/mL   CBC Auto Differential   Result Value Ref Range    WBC 10.34 3.40 - 10.80 10*3/mm3    RBC 5.08 4.14 - 5.80 10*6/mm3    Hemoglobin 14.1 13.0 - 17.7 g/dL    Hematocrit 42.9 37.5 - 51.0 %    MCV 84.4 79.0 - 97.0 fL    MCH 27.8 26.6 - 33.0 pg    MCHC 32.9 31.5 - 35.7 g/dL    RDW 13.0 12.3 - 15.4 %    RDW-SD  39.8 37.0 - 54.0 fl    MPV 9.2 6.0 - 12.0 fL    Platelets 206 140 - 450 10*3/mm3    Neutrophil % 82.4 (H) 42.7 - 76.0 %    Lymphocyte % 8.5 (L) 19.6 - 45.3 %    Monocyte % 7.2 5.0 - 12.0 %    Eosinophil % 0.5 0.3 - 6.2 %    Basophil % 0.6 0.0 - 1.5 %    Immature Grans % 0.8 (H) 0.0 - 0.5 %    Neutrophils, Absolute 8.53 (H) 1.70 - 7.00 10*3/mm3    Lymphocytes, Absolute 0.88 0.70 - 3.10 10*3/mm3    Monocytes, Absolute 0.74 0.10 - 0.90 10*3/mm3    Eosinophils, Absolute 0.05 0.00 - 0.40 10*3/mm3    Basophils, Absolute 0.06 0.00 - 0.20 10*3/mm3    Immature Grans, Absolute 0.08 (H) 0.00 - 0.05 10*3/mm3    nRBC 0.0 0.0 - 0.2 /100 WBC   Hepatitis Panel, Acute   Result Value Ref Range    Hepatitis B Surface Ag Non-Reactive Non-Reactive    Hep A IgM Non-Reactive Non-Reactive    Hep B C IgM Non-Reactive Non-Reactive    Hepatitis C Ab Non-Reactive Non-Reactive     US Abdomen Limited   Final Result   Moderate sludge in the gallbladder, otherwise   essentially unremarkable.      Electronically signed by:  Nahum Guerrero  7/7/2019 9:04 PM CDT   Workstation: [id number]              [facility]  Number of Diagnoses or Management Options  Elevated LFTs: new and requires workup  Diagnosis management comments: Patient placed in bed 20  and evaluated by me.  Vitals are hemodynamically stable, afebrile, non-tachycardic, satting well on room air.  Patient is resting comfortably in no apparent distress.  Labs obtained which showed elevated LFTs approximately 500, elevated bilirubin 1.5.  Troponin negative x1.  Lipase is normal.  Hepatitis panel is negative.  Creatinine slightly elevated, but this appears to be his baseline.  Right upper quadrant ultrasound showed moderate amount of sludge in the gallbladder but was otherwise unremarkable.  Patient states that he intends to follow-up with his primary care physician in [place] this week may be referred to a GI specialist in that city.  On reevaluation, patient is alert and resting  comfortably. I discussed the results of the emergency department evaluation with the patient.  I recommended primary care follow-up.  I advised the patient to return to the emergency department if their symptoms change or worsen.       Amount and/or Complexity of Data Reviewed  Clinical lab tests: ordered and reviewed  Tests in the radiology section of CPT®: ordered and reviewed  Tests in the medicine section of CPT®: ordered and reviewed  Decide to obtain previous medical records or to obtain history from someone other than the patient: yes  Obtain history from someone other than the patient: yes  Review and summarize past medical records: yes  Discuss the patient with other providers: yes  Independent visualization of images, tracings, or specimens: yes    Risk of Complications, Morbidity, and/or Mortality  Presenting problems: moderate  Diagnostic procedures: moderate  Management options: moderate    Patient Progress  Patient progress: improved        Final diagnoses:   Elevated LFTs            Jovi Polk MD  Resident  07/07/19 [id number]

## 2019-07-07 NOTE — ED NOTES
Pt sent with imaging and lab values from [employer]. Pt was told he had abnormal renal and hepatic lab levels. Pt in no pain at this time.      Marilyn Lee, RN  07/07/19 [id number]

## 2019-08-07 ENCOUNTER — OFFICE VISIT (OUTPATIENT)
Dept: CARDIAC SURGERY | Facility: CLINIC | Age: 74
End: 2019-08-07

## 2019-08-07 VITALS
SYSTOLIC BLOOD PRESSURE: 130 MMHG | HEIGHT: 76 IN | TEMPERATURE: 98.1 F | BODY MASS INDEX: 27.52 KG/M2 | DIASTOLIC BLOOD PRESSURE: 65 MMHG | OXYGEN SATURATION: 98 % | WEIGHT: 226 LBS | HEART RATE: 75 BPM

## 2019-08-07 DIAGNOSIS — I25.10 CORONARY ARTERY DISEASE INVOLVING NATIVE CORONARY ARTERY OF NATIVE HEART WITHOUT ANGINA PECTORIS: Primary | ICD-10-CM

## 2019-08-07 DIAGNOSIS — I65.23 BILATERAL CAROTID ARTERY STENOSIS: ICD-10-CM

## 2019-08-07 PROCEDURE — 99213 OFFICE O/P EST LOW 20 MIN: CPT | Performed by: NURSE PRACTITIONER

## 2019-08-07 NOTE — PATIENT INSTRUCTIONS
The results of your carotid ultrasound shows mild disease of the right carotid and is stable from previous exam, ultrasound of the left carotid shows moderate disease and is stable from previous exam.  Ask PCP about praluent for management of LDL.  Follow up in one year for repeat carotid ultrasound.  Can try magnesium at bed time for leg pain and cramping.  If you should experience any neurological symptoms including but not limited to visual or speech disturbances confusion, seizures, or weakness of limbs of one side of your body notify Heart and Vascular center immediately for evaluation or if after hours present to the nearest Emergency Department.

## 2019-08-14 NOTE — PROGRESS NOTES
CVTS Office Progress Note     Subjective   Patient ID: Esteban Ferreira is a 73 y.o. male is here today for follow-up carotid stenosis    Chief Complaint:    Chief Complaint   Patient presents with   • Carotid Artery Disease     6 month follow up        PCP:  Reyes, David P, MD  Cardiology:  Dr. Kumar     History of Present Illness  Jhon is a pleasant 73-year-old male with a history of CAD, carotid stenosis, hypertension, hyperlipidemia, type 2 diabetes.  Status cardiothoracic and vascular surgery in 2013 following non-STEMI with left heart catheterization demonstrating severe multivessel disease.  Patient subsequently underwent CABG times 3 October 2013 during preoperative work-up moderate left internal carotid stenosis was discovered, patient recovered well from surgery and has remained in carotid surveillance on a yearly basis ever since.  He remains on aspirin, Cardizem, metoprolol, Rythmol, pravastatin for medical management of cardiovascular disease.    10/4/3  Carotid US:  LICA 50-69 %  10/7/13  CABG x3  6/5/14    Carotid US:  LISSA 0-15 %  LICA 50-79 % (ratio 2.3)  12/17/14  Carotid US:  LISSA 0-15 %  LICA 50-79 % (ratio 2.5)  6/18/15   Carotid US:  LISSA 0-15 %  LICA 50-79 % (ratio 2.4)  01/19/17  Carotid US:  LISSA 0-15 %  LICA 50-79 % ratio 1.7 maxPSV 142  07/19/17  Carotid US:  LISSA 0-49 %  LICA 50-69 % ratio 2.7 maxPSV 147  05/03/18   Carotid US:  LISSA 0-49 %  LICA 50-69 % ratio 2.7 maxPSV 168  11/26/18   Carotid US:  LISSA 0-49 % ratio 2.6 maxPSV 81 V antegrade LICA 50-69 % ratio 1.7 maxPSV 137 V antegrade  8/8/2019 Carotid Duplex: LISSA 0-49 mPSV 80c/s Ratio 1.4, LICA 50-69 mPSV 130c/s Ratio 2.1       The following portions of the patient's history were reviewed and updated as appropriate: allergies, current medications, past family history, past medical history, past social history, past surgical history and problem list.  Recent images independently reviewed.  Available laboratory values  reviewed.      Past Medical History:   Diagnosis Date   • Anemia      chronic with acute blood loss      • Carotid artery stenosis      LISSA 0-15 % LICA 50-79 %   • Coronary arteriosclerosis    • Dyslipidemia    • Essential hypertension    • Surgical follow-up care    • Type 2 diabetes mellitus (CMS/[facility])      Past Surgical History:   Procedure Laterality Date   • BREAST BIOPSY  1996    Left benign   • CARDIAC CATHETERIZATION  10/04/2013    Proximal LAD artery with 9-95% stenosis which is probably the culprit vessel. First obtuse margional branch with 70-80% stenosis. Diagonal branch #1 at it origin at the ostium with 60-70% stenosis. Anteroapical wall hypokinesis with EF 45-50%.   • CORONARY ARTERY BYPASS GRAFT  10/07/2013    X 3, LIMA to LAD, SVG to OMB, and SVG to PDA. Endo vein harvesting   • TRANSESOPHAGEAL ECHOCARDIOGRAM (SHAKIRA)  01/29/2016    with color flow-Mild CLVH with mild left atrial enlargement.LV systolic function well preserved with Ef of 50-55%.Evidence of mild inferior wall hypokinesis.Mitral valve mildly thickened.AV thickened.Tricuspid intact.Mild mitral and tricuspid regurg     Family History   Problem Relation Age of Onset   • Diabetes Mother    • Coronary artery disease Father    • Heart disease Father    • Sudden death Father    • Coronary artery disease Brother    • Heart disease Brother    • Coronary artery disease Paternal Grandmother    • Heart disease Paternal Grandmother      Social History     Tobacco Use   • Smoking status: Never Smoker   • Smokeless tobacco: Never Used   Substance Use Topics   • Alcohol use: No   • Drug use: No       ALLERGIES:   Ace inhibitors and Penicillin g    MEDICATIONS:      Current Outpatient Medications:   •  aspirin 81 MG chewable tablet, Chew 81 mg Daily., Disp: , Rfl:   •  Canagliflozin (INVOKANA) 300 MG tablet, Take 300 mg by mouth., Disp: , Rfl:   •  Cyanocobalamin (VITAMIN B 12 PO), Take  by mouth., Disp: , Rfl:   •  diltiazem (CARDIZEM) 120 MG tablet,  Take 120 mg by mouth Daily., Disp: , Rfl:   •  glipiZIDE (GLUCOTROL) 10 MG tablet, Take 10 mg by mouth Daily., Disp: , Rfl:   •  metFORMIN (GLUCOPHAGE) 500 MG tablet, Take 500 mg by mouth Daily., Disp: , Rfl:   •  metoprolol succinate XL (TOPROL-XL) 25 MG 24 hr tablet, Take 25 mg by mouth 2 (Two) Times a Day., Disp: , Rfl:   •  ondansetron (ZOFRAN) 4 MG tablet, Take 4 mg by mouth Every 8 (Eight) Hours As Needed for Nausea or Vomiting., Disp: , Rfl:   •  pantoprazole (PROTONIX) 40 MG EC tablet, Take 40 mg by mouth Daily., Disp: , Rfl:   •  propafenone (RHTHYMOL) 150 MG tablet, Take 150 mg by mouth 3 (Three) Times a Day., Disp: , Rfl:   •  metFORMIN (GLUCOPHAGE) 500 MG tablet, Take 500 mg by mouth 3 (Three) Times a Day., Disp: , Rfl:   •  pravastatin (PRAVACHOL) 20 MG tablet, Take 20 mg by mouth Every Night., Disp: , Rfl:     Review of Systems   Constitution: Positive for weakness. Negative for chills, decreased appetite, fever and weight loss.   HENT: Negative for congestion, nosebleeds and sore throat.    Eyes: Negative for blurred vision, visual disturbance and visual halos.   Cardiovascular: Positive for dyspnea on exertion. Negative for chest pain and leg swelling.        Patient does not report signs or symptoms of sternal malunion, denies popping, clicking, shifting, or uncontrolled pain.   Respiratory: Negative for cough, shortness of breath, sputum production and wheezing.    Endocrine: Negative for cold intolerance and polyuria.   Hematologic/Lymphatic: Negative for bleeding problem. Bruises/bleeds easily.        Does not report S/S of adverse bleeding event including nose bleeds, hematuria, melena, gingival bleeding, or hematemesis.   Skin: Negative for flushing, nail changes and unusual hair distribution.   Musculoskeletal: Positive for arthritis, back pain and joint pain.   Gastrointestinal: Negative for bloating, abdominal pain, hematemesis, melena, nausea and vomiting.   Genitourinary: Negative for  flank pain and hematuria.   Neurological: Negative for brief paralysis, difficulty with concentration, focal weakness, light-headedness, loss of balance, numbness and paresthesias.        No amaurosis fugax, TIA, or CVA.     Psychiatric/Behavioral: Negative for altered mental status, depression, substance abuse and suicidal ideas.   Allergic/Immunologic: Negative for hives and persistent infections.     Vitals:    08/07/19 1526   BP: 130/65   Pulse: 75   Temp: 98.1 °F (36.7 °C)   SpO2: 98%      Objective      Body mass index is 27.51 kg/m².  Physical Exam   Constitutional: He is oriented to person, place, and time. He appears well-developed and well-nourished.   HENT:   Head: Normocephalic and atraumatic.   Eyes: Conjunctivae and EOM are normal. Pupils are equal, round, and reactive to light.   Neck: Normal range of motion. Neck supple.   Cardiovascular: Normal rate and normal heart sounds.   No murmur heard.  Pulmonary/Chest: Effort normal and breath sounds normal. No respiratory distress.   Abdominal: Soft. Bowel sounds are normal. He exhibits no distension.   Genitourinary:   Genitourinary Comments: deferred   Musculoskeletal: Normal range of motion. He exhibits no edema.   Neurological: He is alert and oriented to person, place, and time. No cranial nerve deficit.   No deficits   Skin: Skin is warm and dry. Capillary refill takes less than 2 seconds.   There is no evidence of skin breakdown of the bilateral lower extremities.  BLE pink, no evidence of ischemia.  Feet are absent of dependent rubor.   Psychiatric: He has a normal mood and affect. His behavior is normal. Judgment normal.   Vitals reviewed.        Assessment & Plan     Independent Review of Studies  8/8/2019 Carotid Duplex: LISSA 0-49 mPSV 80c/s Ratio 1.4, LICA 50-69 mPSV 130c/s Ratio 2.1    Carotid Artery Disease  Results of today's Carotid Duplex demonstrates mild disease of Right Carotid and moderate of Left Carotid.  Would recommend follow up  with repeat carotid duplex in 12 months to monitor progression of carotid artery disease.  We recommend CT angiogram of carotids once >70% stenosis is suggested by duplex in patients with stable renal function.  Patients with >70% stenosis proven by CT would be considered for intervention RADHA/CEA.  Continue medical management with ASA, BB, CCB, Statin.  Lifestyle changes as below.    Lifestyle Modifications  Recommended lifestyle modifications to reduce the progression of vascular disease include increasing moderate activity to 150 minutes per week, avoidance of smoking, optimizing blood pressure control, strict control of diabetes, and management of hyperlipidemia.  Choose a diet that emphasizes intake of vegetables, fruits, and whole grains; includes low-fat dairy products, poultry, fish, legumes, nontropical vegetable oils, and nuts; and limits intake of sweets, sugar-sweetened beverages, and red meats.  Limit alcohol intake to one drink per day in females and 2 drinks per day in men.        Detailed discussion regarding risks, benefits, and treatment plan. Images independently reviewed. Patient understands, agrees, and wishes to proceed with plan.       This document has been electronically signed by LORRAINE Heath-BC @  On August 14, 2019 2:22 PM

## 2020-07-24 DIAGNOSIS — I65.23 BILATERAL CAROTID ARTERY STENOSIS: Primary | ICD-10-CM

## 2020-09-03 ENCOUNTER — OFFICE VISIT (OUTPATIENT)
Dept: CARDIAC SURGERY | Facility: CLINIC | Age: 75
End: 2020-09-03

## 2020-09-03 VITALS
HEIGHT: 76 IN | DIASTOLIC BLOOD PRESSURE: 64 MMHG | SYSTOLIC BLOOD PRESSURE: 128 MMHG | HEART RATE: 106 BPM | BODY MASS INDEX: 26.04 KG/M2 | WEIGHT: 213.8 LBS | OXYGEN SATURATION: 99 %

## 2020-09-03 DIAGNOSIS — I65.23 BILATERAL CAROTID ARTERY STENOSIS: ICD-10-CM

## 2020-09-03 DIAGNOSIS — I25.10 CORONARY ARTERY DISEASE INVOLVING NATIVE CORONARY ARTERY OF NATIVE HEART WITHOUT ANGINA PECTORIS: Primary | ICD-10-CM

## 2020-09-03 PROCEDURE — 99213 OFFICE O/P EST LOW 20 MIN: CPT | Performed by: NURSE PRACTITIONER

## 2020-09-03 RX ORDER — INSULIN DEGLUDEC 100 U/ML
15 INJECTION, SOLUTION SUBCUTANEOUS
COMMUNITY

## 2020-09-03 NOTE — PATIENT INSTRUCTIONS
The results of your carotid ultrasound shows mild disease of the right carotid and is stable from previous exam, ultrasound of the left carotid shows moderate disease and is stable from previous exam.    Follow up in 6 months    If you should experience any neurological symptoms including but not limited to visual or speech disturbances confusion, seizures, or weakness of limbs of one side of your body notify [facility] V[facility] center immediately for evaluation or if after hours present to the nearest Emergency Department.

## 2020-09-04 NOTE — PROGRESS NOTES
CVTS Office Progress Note     Subjective   Patient ID: Esteban Ferreira is a 74 y.o. male is here today for follow-up carotid stenosis    Chief Complaint:    Chief Complaint   Patient presents with   • Carotid Artery Disease     1 yr f/u       HPI:      PCP:  Reyes, David P, MD  Cardiology:    Nephrology:      74 y.o. male with HTN(stable, increased risk stroke, rupture), Hyperlipidemia(stable, increased risk cardiovascular events) and Diabetes Mellitus(stable, increased risk cardiovascular events) CAD (s/p CABG).  never smoked.  Established with [facility] 2013 following NSTEMI, underwent elective CABGx3. No post surgical complications.  Remains in follow up for moderate L ICA disease.  No other associated signs, symptoms or modifying factors.      10/4/3  Carotid US:  LICA 50-69 %  10/7/13  CABG x3  6/5/14    Carotid US:  LISSA 0-15 %  LICA 50-79 % (ratio 2.3)  12/17/14  Carotid US:  LISSA 0-15 %  LICA 50-79 % (ratio 2.5)  6/18/15   Carotid US:  LISSA 0-15 %  LICA 50-79 % (ratio 2.4)  01/19/17  Carotid US:  LISSA 0-15 %  LICA 50-79 % ratio 1.7 maxPSV 142  07/19/17  Carotid US:  LISSA 0-49 %  LICA 50-69 % ratio 2.7 maxPSV 147  05/03/18   Carotid US:  LISSA 0-49 %  LICA 50-69 % ratio 2.7 maxPSV 168  11/26/18   Carotid US:  LISSA 0-49 % ratio 2.6 maxPSV 81 V antegrade LICA 50-69 % ratio 1.7 maxPSV 137 V antegrade  8/8/2019 Carotid Duplex: LISSA 0-49 mPSV 80c/s Ratio 1.4, LICA 50-69 mPSV 130c/s Ratio 2.1  9/2020 Carotid Duplex: LISSA 0-49% mPSV 95c/s Ratio 2.5, LICA 50-69% mPSV 157c/s Ratio 3.2, Antegrade vertebrals       The following portions of the patient's history were reviewed and updated as appropriate: allergies, current medications, past family history, past medical history, past social history, past surgical history and problem list.  Recent images independently reviewed.  Available laboratory values reviewed.      Past Medical History:   Diagnosis Date   • Anemia      chronic with acute blood loss      • Carotid artery  stenosis      LISSA 0-15 % LICA 50-79 %   • Coronary arteriosclerosis    • Dyslipidemia    • Essential hypertension    • Surgical follow-up care    • Type 2 diabetes mellitus (CMS/[facility])      Past Surgical History:   Procedure Laterality Date   • BREAST BIOPSY  1996    Left benign   • CARDIAC CATHETERIZATION  10/04/2013    Proximal LAD artery with 9-95% stenosis which is probably the culprit vessel. First obtuse margional branch with 70-80% stenosis. Diagonal branch #1 at it origin at the ostium with 60-70% stenosis. Anteroapical wall hypokinesis with EF 45-50%.   • CORONARY ARTERY BYPASS GRAFT  10/07/2013    X 3, LIMA to LAD, SVG to OMB, and SVG to PDA. Endo vein harvesting   • TRANSESOPHAGEAL ECHOCARDIOGRAM (SHAKIRA)  01/29/2016    with color flow-Mild CLVH with mild left atrial enlargement.LV systolic function well preserved with Ef of 50-55%.Evidence of mild inferior wall hypokinesis.Mitral valve mildly thickened.AV thickened.Tricuspid intact.Mild mitral and tricuspid regurg     Family History   Problem Relation Age of Onset   • Diabetes Mother    • Coronary artery disease Father    • Heart disease Father    • Sudden death Father    • Coronary artery disease Brother    • Heart disease Brother    • Coronary artery disease Paternal Grandmother    • Heart disease Paternal Grandmother      Social History     Tobacco Use   • Smoking status: Never Smoker   • Smokeless tobacco: Never Used   Substance Use Topics   • Alcohol use: No   • Drug use: No       ALLERGIES:   Ace inhibitors and Penicillin g    MEDICATIONS:      Current Outpatient Medications:   •  aspirin 81 MG chewable tablet, Chew 81 mg Daily., Disp: , Rfl:   •  Canagliflozin (INVOKANA) 300 MG tablet, Take 300 mg by mouth., Disp: , Rfl:   •  Cyanocobalamin (VITAMIN B 12 PO), Take  by mouth., Disp: , Rfl:   •  diltiazem (CARDIZEM) 120 MG tablet, Take 120 mg by mouth Daily., Disp: , Rfl:   •  glipiZIDE (GLUCOTROL) 10 MG tablet, Take 10 mg by mouth Daily., Disp: , Rfl:    •  glucose blood test strip, , Disp: , Rfl:   •  Insulin Degludec (Tresiba) 100 UNIT/ML solution injection, Inject 15 Units under the skin into the appropriate area as directed., Disp: , Rfl:   •  metFORMIN (GLUCOPHAGE) 500 MG tablet, Take 500 mg by mouth 2 (Two) Times a Day With Meals., Disp: , Rfl:   •  metoprolol succinate XL (TOPROL-XL) 25 MG 24 hr tablet, Take 25 mg by mouth 2 (Two) Times a Day., Disp: , Rfl:   •  ondansetron (ZOFRAN) 4 MG tablet, Take 4 mg by mouth Every 8 (Eight) Hours As Needed for Nausea or Vomiting., Disp: , Rfl:   •  pantoprazole (PROTONIX) 40 MG EC tablet, Take 40 mg by mouth Daily., Disp: , Rfl:   •  pravastatin (PRAVACHOL) 20 MG tablet, Take 20 mg by mouth Every Night., Disp: , Rfl:   •  propafenone (RHTHYMOL) 150 MG tablet, Take 150 mg by mouth 3 (Three) Times a Day., Disp: , Rfl:     Review of Systems   Constitution: Negative for chills, decreased appetite, fever and weight loss.   HENT: Negative for congestion, nosebleeds and sore throat.    Eyes: Negative for blurred vision, visual disturbance and visual halos.   Cardiovascular: Positive for dyspnea on exertion. Negative for chest pain and leg swelling.        Patient does not report signs or symptoms of sternal malunion, denies popping, clicking, shifting, or uncontrolled pain.   Respiratory: Negative for cough, shortness of breath, sputum production and wheezing.    Endocrine: Negative for cold intolerance and polyuria.   Hematologic/Lymphatic: Negative for bleeding problem. Bruises/bleeds easily.        Does not report S/S of adverse bleeding event including nose bleeds, hematuria, melena, gingival bleeding, or hematemesis.   Skin: Negative for flushing, nail changes and unusual hair distribution.   Musculoskeletal: Positive for arthritis, back pain and joint pain.   Gastrointestinal: Negative for bloating, abdominal pain, hematemesis, melena, nausea and vomiting.   Genitourinary: Negative for flank pain and hematuria.    Neurological: Positive for weakness. Negative for brief paralysis, difficulty with concentration, focal weakness, light-headedness, loss of balance, numbness and paresthesias.        No amaurosis fugax, TIA, or CVA.     Psychiatric/Behavioral: Negative for altered mental status, depression, substance abuse and suicidal ideas.   Allergic/Immunologic: Negative for hives and persistent infections.     Vitals:    09/03/20 1345   BP: 128/64   Pulse: 106   SpO2: 99%      Objective   Heart Rate:  [106] 106  BP: (128)/(64) 128/64  Body mass index is 26.02 kg/m².  Physical Exam   Constitutional: He is oriented to person, place, and time. He appears well-developed and well-nourished.   HENT:   Head: Normocephalic and atraumatic.   Eyes: Pupils are equal, round, and reactive to light. Conjunctivae and EOM are normal.   Neck: Normal range of motion. Neck supple.   Cardiovascular: Normal rate and normal heart sounds.   No murmur heard.  Pulmonary/Chest: Effort normal and breath sounds normal. No respiratory distress.   Abdominal: Soft. Bowel sounds are normal. He exhibits no distension.   Genitourinary:   Genitourinary Comments: deferred   Musculoskeletal: Normal range of motion. He exhibits no edema.   Neurological: He is alert and oriented to person, place, and time. No cranial nerve deficit.   No deficits   Skin: Skin is warm and dry. Capillary refill takes less than 2 seconds.   There is no evidence of skin breakdown of the bilateral lower extremities.  BLE pink, no evidence of ischemia.  Feet are absent of dependent rubor.   Psychiatric: He has a normal mood and affect. His behavior is normal. Judgment normal.   Vitals reviewed.        Assessment & Plan     Independent Review of Studies  9/2020 Carotid Duplex: LISSA 0-49% mPSV 95c/s Ratio 2.5, LICA 50-69% mPSV 157c/s Ratio 3.2, Antegrade vertebrals    1. Coronary artery disease involving native coronary artery of native heart without angina pectoris  ASA, Statin, BB,  CCB  No current angina or equivalent  Follows up routinely with cardiology (Dr. newell)    2. Bilateral carotid artery stenosis  Mild R ICA disease, moderate L ICA.  Asymptomatic.   Repeat duplex in 6 months  Medical management as above    - Duplex Carotid Ultrasound CAR; Future    Lipid-lowering therapy has been proven beneficial in patients with cardio-vascular disease. Current guidelines recommend statin treatment for all patients with PAD,CAD and carotid stenosis. Statins are beneficial in preventing cardiovascular events, increasing functional capacity and lower the risk of adverse limb loss in PAD. Statins decrease the progression of plaque formation and may improve peripheral vessel lining, and aid in reversing atherosclerosis.        Detailed discussion regarding risks, benefits, and treatment plan. Images independently reviewed. Patient understands, agrees, and wishes to proceed with plan.       This document has been electronically signed by LORRAINE Heath-BC @  On September 4, 2020 11:03

## 2021-03-04 ENCOUNTER — OFFICE VISIT (OUTPATIENT)
Dept: CARDIAC SURGERY | Facility: CLINIC | Age: 76
End: 2021-03-04

## 2021-03-04 VITALS
HEART RATE: 73 BPM | OXYGEN SATURATION: 99 % | SYSTOLIC BLOOD PRESSURE: 127 MMHG | BODY MASS INDEX: 28.21 KG/M2 | TEMPERATURE: 97.8 F | HEIGHT: 74 IN | DIASTOLIC BLOOD PRESSURE: 72 MMHG | WEIGHT: 219.8 LBS

## 2021-03-04 DIAGNOSIS — E78.2 MIXED HYPERLIPIDEMIA: ICD-10-CM

## 2021-03-04 DIAGNOSIS — I65.23 BILATERAL CAROTID ARTERY STENOSIS: Primary | ICD-10-CM

## 2021-03-04 PROCEDURE — 99213 OFFICE O/P EST LOW 20 MIN: CPT | Performed by: NURSE PRACTITIONER

## 2021-03-04 NOTE — PROGRESS NOTES
CVTS Office Progress Note     Subjective   Patient ID: Esteban Ferreira is a 75 y.o. male is here today for follow-up carotid stenosis    Chief Complaint:    Chief Complaint   Patient presents with   • Carotid Artery Disease       HPI:      PCP:  Vy Evans DO  Cardiology:    Nephrology:      75 y.o. male with HTN(stable, increased risk stroke, rupture), Hyperlipidemia(stable, increased risk cardiovascular events) and Diabetes Mellitus(stable, increased risk cardiovascular events) CAD (s/p CABG).  never smoked.  Established with [facility] 2013 following NSTEMI, underwent elective CABGx3. No post surgical complications.  Remains in follow up for moderate L ICA disease.  No other associated signs, symptoms or modifying factors.      10/4/3  Carotid US:  LICA 50-69 %  10/7/13  CABG x3  6/5/14    Carotid US:  LISSA 0-15 %  LICA 50-79 % (ratio 2.3)  12/17/14  Carotid US:  LISSA 0-15 %  LICA 50-79 % (ratio 2.5)  6/18/15   Carotid US:  LISSA 0-15 %  LICA 50-79 % (ratio 2.4)  01/19/17  Carotid US:  LISSA 0-15 %  LICA 50-79 % ratio 1.7 maxPSV 142  07/19/17  Carotid US:  LISSA 0-49 %  LICA 50-69 % ratio 2.7 maxPSV 147  05/03/18   Carotid US:  LISSA 0-49 %  LICA 50-69 % ratio 2.7 maxPSV 168  11/26/18   Carotid US:  LISSA 0-49 % ratio 2.6 maxPSV 81 V antegrade LICA 50-69 % ratio 1.7 maxPSV 137 V antegrade  8/8/2019 Carotid Duplex: LISSA 0-49 mPSV 80c/s Ratio 1.4, LICA 50-69 mPSV 130c/s Ratio 2.1  9/2020 Carotid Duplex: LISSA 0-49% mPSV 95c/s Ratio 2.5, LICA 50-69% mPSV 157c/s Ratio 3.2, Antegrade vertebrals  3/2021 Carotid Duplex: LISSA 0-49% mPSV 96c/s Ratio 1.8, LICA 50-69% mPSV 133c/s Ratio 2.7, Antegrade vertebrals         The following portions of the patient's history were reviewed and updated as appropriate: allergies, current medications, past family history, past medical history, past social history, past surgical history and problem list.  Recent images independently reviewed.  Available laboratory values  reviewed.      Past Medical History:   Diagnosis Date   • Anemia      chronic with acute blood loss      • Carotid artery stenosis      LISSA 0-15 % LICA 50-79 %   • Coronary arteriosclerosis    • Dyslipidemia    • Essential hypertension    • Surgical follow-up care    • Type 2 diabetes mellitus (CMS/[facility])      Past Surgical History:   Procedure Laterality Date   • BREAST BIOPSY  1996    Left benign   • CARDIAC CATHETERIZATION  10/04/2013    Proximal LAD artery with 9-95% stenosis which is probably the culprit vessel. First obtuse margional branch with 70-80% stenosis. Diagonal branch #1 at it origin at the ostium with 60-70% stenosis. Anteroapical wall hypokinesis with EF 45-50%.   • CORONARY ARTERY BYPASS GRAFT  10/07/2013    X 3, LIMA to LAD, SVG to OMB, and SVG to PDA. Endo vein harvesting   • TRANSESOPHAGEAL ECHOCARDIOGRAM (SHAKIRA)  01/29/2016    with color flow-Mild CLVH with mild left atrial enlargement.LV systolic function well preserved with Ef of 50-55%.Evidence of mild inferior wall hypokinesis.Mitral valve mildly thickened.AV thickened.Tricuspid intact.Mild mitral and tricuspid regurg     Family History   Problem Relation Age of Onset   • Diabetes Mother    • Coronary artery disease Father    • Heart disease Father    • Sudden death Father    • Coronary artery disease Brother    • Heart disease Brother    • Coronary artery disease Paternal Grandmother    • Heart disease Paternal Grandmother      Social History     Tobacco Use   • Smoking status: Never Smoker   • Smokeless tobacco: Never Used   Substance Use Topics   • Alcohol use: No   • Drug use: No       ALLERGIES:   Ace inhibitors and Penicillin g    MEDICATIONS:      Current Outpatient Medications:   •  aspirin 81 MG chewable tablet, Chew 81 mg Daily., Disp: , Rfl:   •  Canagliflozin (INVOKANA) 300 MG tablet, Take 300 mg by mouth., Disp: , Rfl:   •  Cyanocobalamin (VITAMIN B 12 PO), Take  by mouth., Disp: , Rfl:   •  diltiazem (CARDIZEM) 120 MG tablet,  Take 120 mg by mouth Daily., Disp: , Rfl:   •  glipiZIDE (GLUCOTROL) 10 MG tablet, Take 10 mg by mouth Daily., Disp: , Rfl:   •  glucose blood test strip, , Disp: , Rfl:   •  Insulin Degludec (Tresiba) 100 UNIT/ML solution injection, Inject 15 Units under the skin into the appropriate area as directed., Disp: , Rfl:   •  metFORMIN (GLUCOPHAGE) 500 MG tablet, Take 500 mg by mouth 2 (Two) Times a Day With Meals., Disp: , Rfl:   •  metoprolol succinate XL (TOPROL-XL) 25 MG 24 hr tablet, Take 25 mg by mouth 2 (Two) Times a Day., Disp: , Rfl:   •  ondansetron (ZOFRAN) 4 MG tablet, Take 4 mg by mouth Every 8 (Eight) Hours As Needed for Nausea or Vomiting., Disp: , Rfl:   •  pantoprazole (PROTONIX) 40 MG EC tablet, Take 40 mg by mouth Daily., Disp: , Rfl:   •  pravastatin (PRAVACHOL) 20 MG tablet, Take 20 mg by mouth Every Night., Disp: , Rfl:   •  propafenone (RHTHYMOL) 150 MG tablet, Take 150 mg by mouth 3 (Three) Times a Day., Disp: , Rfl:     Review of Systems   Constitution: Negative for chills, decreased appetite, fever and weight loss.   HENT: Negative for congestion, nosebleeds and sore throat.    Eyes: Negative for blurred vision, visual disturbance and visual halos.   Cardiovascular: Positive for dyspnea on exertion. Negative for chest pain and leg swelling.        Patient does not report signs or symptoms of sternal malunion, denies popping, clicking, shifting, or uncontrolled pain.   Respiratory: Negative for cough, shortness of breath, sputum production and wheezing.    Endocrine: Negative for cold intolerance and polyuria.   Hematologic/Lymphatic: Negative for bleeding problem. Bruises/bleeds easily.        Does not report S/S of adverse bleeding event including nose bleeds, hematuria, melena, gingival bleeding, or hematemesis.   Skin: Negative for flushing, nail changes and unusual hair distribution.   Musculoskeletal: Positive for arthritis, back pain and joint pain.   Gastrointestinal: Negative for  bloating, abdominal pain, hematemesis, melena, nausea and vomiting.   Genitourinary: Negative for flank pain and hematuria.   Neurological: Positive for weakness. Negative for brief paralysis, difficulty with concentration, focal weakness, light-headedness, loss of balance, numbness and paresthesias.        No amaurosis fugax, TIA, or CVA.     Psychiatric/Behavioral: Negative for altered mental status, depression, substance abuse and suicidal ideas.   Allergic/Immunologic: Negative for hives and persistent infections.     Vitals:    03/04/21 1408   BP: 127/72   Pulse: 73   Temp: 97.8 °F (36.6 °C)   SpO2: 99%      Objective   Temp:  [97.8 °F (36.6 °C)] 97.8 °F (36.6 °C)  Heart Rate:  [73] 73  BP: (127)/(72) 127/72  Body mass index is 28.22 kg/m².  Physical Exam   Constitutional: He is oriented to person, place, and time. He appears well-developed.   HENT:   Head: Normocephalic and atraumatic.   Eyes: Pupils are equal, round, and reactive to light. Conjunctivae are normal.   Neck: Normal range of motion. Neck supple.   Cardiovascular: Normal rate and normal heart sounds.   No murmur heard.  Pulmonary/Chest: Effort normal and breath sounds normal. No respiratory distress.   Abdominal: Soft. Bowel sounds are normal. He exhibits no distension.   Genitourinary:    Genitourinary Comments: deferred     Musculoskeletal: Normal range of motion.   Neurological: He is alert and oriented to person, place, and time. No cranial nerve deficit.   No deficits   Skin: Skin is warm and dry. Capillary refill takes less than 2 seconds.   There is no evidence of skin breakdown of the bilateral lower extremities.  BLE pink, no evidence of ischemia.  Feet are absent of dependent rubor.   Psychiatric: His behavior is normal. Judgment normal.   Vitals reviewed.        Assessment & Plan     Independent Review of Studies  3/2021 Carotid Duplex: LISSA 0-49% mPSV 96c/s Ratio 1.8, LICA 50-69% mPSV 133c/s Ratio 2.7, Antegrade vertebrals    1. Mixed  hyperlipidemia  Lipid-lowering therapy has been proven beneficial in patients with cardio-vascular disease. Current guidelines recommend statin treatment for all patients with PAD,CAD and carotid stenosis. Statins are beneficial in preventing cardiovascular events, increasing functional capacity and lower the risk of adverse limb loss in PAD. Statins decrease the progression of plaque formation and may improve peripheral vessel lining, and aid in reversing atherosclerosis.      2. Bilateral carotid artery stenosis  Mild R ICA disease, moderate L ICA.  Asymptomatic.   Repeat duplex in 6 months  Medical management as above    - Duplex Carotid Ultrasound CAR; Future    Lipid-lowering therapy has been proven beneficial in patients with cardio-vascular disease. Current guidelines recommend statin treatment for all patients with PAD,CAD and carotid stenosis. Statins are beneficial in preventing cardiovascular events, increasing functional capacity and lower the risk of adverse limb loss in PAD. Statins decrease the progression of plaque formation and may improve peripheral vessel lining, and aid in reversing atherosclerosis.        Detailed discussion regarding risks, benefits, and treatment plan. Images independently reviewed. Patient understands, agrees, and wishes to proceed with plan.       This document has been electronically signed by Jorge Sofia AGACNP-BC @  On March 4, 2021 14:30 CST

## 2021-11-10 ENCOUNTER — OFFICE VISIT (OUTPATIENT)
Dept: CARDIAC SURGERY | Facility: CLINIC | Age: 76
End: 2021-11-10

## 2021-11-10 VITALS
BODY MASS INDEX: 27.72 KG/M2 | HEIGHT: 74 IN | WEIGHT: 216 LBS | DIASTOLIC BLOOD PRESSURE: 70 MMHG | OXYGEN SATURATION: 100 % | SYSTOLIC BLOOD PRESSURE: 122 MMHG | HEART RATE: 89 BPM

## 2021-11-10 DIAGNOSIS — I65.23 BILATERAL CAROTID ARTERY STENOSIS: ICD-10-CM

## 2021-11-10 DIAGNOSIS — E78.2 MIXED HYPERLIPIDEMIA: ICD-10-CM

## 2021-11-10 DIAGNOSIS — I25.10 CORONARY ARTERY DISEASE INVOLVING NATIVE CORONARY ARTERY OF NATIVE HEART WITHOUT ANGINA PECTORIS: Primary | ICD-10-CM

## 2021-11-10 PROCEDURE — 99213 OFFICE O/P EST LOW 20 MIN: CPT | Performed by: NURSE PRACTITIONER

## 2021-11-10 NOTE — PATIENT INSTRUCTIONS
The results of your carotid ultrasound shows mild disease of the right carotid and is stable from previous exam, ultrasound of the left carotid shows moderate disease and is stable from previous exam.    Follow up in 12 months    If you should experience any neurological symptoms including but not limited to visual or speech disturbances confusion, seizures, or weakness of limbs of one side of your body notify [facility] immediately for evaluation or if after hours present to the nearest Emergency Department.

## 2021-11-10 NOTE — PROGRESS NOTES
CVTS Office Progress Note     Subjective   Patient ID: Esteban Ferreira is a 76 y.o. male is here today for follow-up carotid stenosis    Chief Complaint:    Chief Complaint   Patient presents with   • Carotid Artery Disease       HPI:      PCP:  Vy Evans DO  Cardiology:    Nephrology:      76 y.o. male with HTN(stable, increased risk stroke, rupture), Hyperlipidemia(stable, increased risk cardiovascular events) and Diabetes Mellitus(stable, increased risk cardiovascular events) CAD (s/p CABG).  never smoked.  Established with [facility] 2013 following NSTEMI, underwent elective CABGx3. No post surgical complications.  Remains in follow up for moderate L ICA disease.  No other associated signs, symptoms or modifying factors.      10/4/3  Carotid US:  LICA 50-69 %  10/7/13  CABG x3  6/5/14    Carotid US:  LISSA 0-15 %  LICA 50-79 % (ratio 2.3)  12/17/14  Carotid US:  LISSA 0-15 %  LICA 50-79 % (ratio 2.5)  6/18/15   Carotid US:  LISSA 0-15 %  LICA 50-79 % (ratio 2.4)  01/19/17  Carotid US:  LISSA 0-15 %  LICA 50-79 % ratio 1.7 maxPSV 142  07/19/17  Carotid US:  LISSA 0-49 %  LICA 50-69 % ratio 2.7 maxPSV 147  05/03/18   Carotid US:  LISSA 0-49 %  LICA 50-69 % ratio 2.7 maxPSV 168  11/26/18   Carotid US:  LISSA 0-49 % ratio 2.6 maxPSV 81 V antegrade LICA 50-69 % ratio 1.7 maxPSV 137 V antegrade  8/8/2019 Carotid Duplex: LISSA 0-49 mPSV 80c/s Ratio 1.4, LICA 50-69 mPSV 130c/s Ratio 2.1  9/2020 Carotid Duplex: LISSA 0-49% mPSV 95c/s Ratio 2.5, LICA 50-69% mPSV 157c/s Ratio 3.2, Antegrade vertebrals  3/2021 Carotid Duplex: LISSA 0-49% mPSV 96c/s Ratio 1.8, LICA 50-69% mPSV 133c/s Ratio 2.7, Antegrade vertebrals  11/2021 Carotid Duplex: LISSA 0-49% mPSV 86c/s Ratio 1.9, LICA 50-69% mPSV 147c/s Ratio 2.3, Antegrade vertebrals       The following portions of the patient's history were reviewed and updated as appropriate: allergies, current medications, past family history, past medical history, past social history, past surgical  history and problem list.  Recent images independently reviewed.  Available laboratory values reviewed.      Past Medical History:   Diagnosis Date   • Anemia      chronic with acute blood loss      • Carotid artery stenosis      LISSA 0-15 % LICA 50-79 %   • Coronary arteriosclerosis    • Dyslipidemia    • Essential hypertension    • Surgical follow-up care    • Type 2 diabetes mellitus (HCC)      Past Surgical History:   Procedure Laterality Date   • BREAST BIOPSY  1996    Left benign   • CARDIAC CATHETERIZATION  10/04/2013    Proximal LAD artery with 9-95% stenosis which is probably the culprit vessel. First obtuse margional branch with 70-80% stenosis. Diagonal branch #1 at it origin at the ostium with 60-70% stenosis. Anteroapical wall hypokinesis with EF 45-50%.   • CORONARY ARTERY BYPASS GRAFT  10/07/2013    X 3, LIMA to LAD, SVG to OMB, and SVG to PDA. Endo vein harvesting   • TRANSESOPHAGEAL ECHOCARDIOGRAM (SHAKIRA)  01/29/2016    with color flow-Mild CLVH with mild left atrial enlargement.LV systolic function well preserved with Ef of 50-55%.Evidence of mild inferior wall hypokinesis.Mitral valve mildly thickened.AV thickened.Tricuspid intact.Mild mitral and tricuspid regurg     Family History   Problem Relation Age of Onset   • Diabetes Mother    • Coronary artery disease Father    • Heart disease Father    • Sudden death Father    • Coronary artery disease Brother    • Heart disease Brother    • Coronary artery disease Paternal Grandmother    • Heart disease Paternal Grandmother      Social History     Tobacco Use   • Smoking status: Never Smoker   • Smokeless tobacco: Never Used   Substance Use Topics   • Alcohol use: No   • Drug use: No       ALLERGIES:   Ace inhibitors and Penicillin g    MEDICATIONS:      Current Outpatient Medications:   •  aspirin 81 MG chewable tablet, Chew 81 mg Daily., Disp: , Rfl:   •  Canagliflozin (INVOKANA) 300 MG tablet, Take 300 mg by mouth., Disp: , Rfl:   •  Cyanocobalamin  (VITAMIN B 12 PO), Take  by mouth., Disp: , Rfl:   •  diltiazem (CARDIZEM) 120 MG tablet, Take 120 mg by mouth Daily., Disp: , Rfl:   •  glipiZIDE (GLUCOTROL) 10 MG tablet, Take 10 mg by mouth Daily., Disp: , Rfl:   •  glucose blood test strip, , Disp: , Rfl:   •  Insulin Degludec (Tresiba) 100 UNIT/ML solution injection, Inject 15 Units under the skin into the appropriate area as directed., Disp: , Rfl:   •  metFORMIN (GLUCOPHAGE) 500 MG tablet, Take 500 mg by mouth 2 (Two) Times a Day With Meals., Disp: , Rfl:   •  metoprolol succinate XL (TOPROL-XL) 25 MG 24 hr tablet, Take 25 mg by mouth 2 (Two) Times a Day., Disp: , Rfl:   •  ondansetron (ZOFRAN) 4 MG tablet, Take 4 mg by mouth Every 8 (Eight) Hours As Needed for Nausea or Vomiting., Disp: , Rfl:   •  pantoprazole (PROTONIX) 40 MG EC tablet, Take 40 mg by mouth Daily., Disp: , Rfl:   •  pravastatin (PRAVACHOL) 20 MG tablet, Take 20 mg by mouth Every Night., Disp: , Rfl:   •  propafenone (RHTHYMOL) 150 MG tablet, Take 150 mg by mouth 3 (Three) Times a Day., Disp: , Rfl:   •  rivaroxaban (XARELTO) 20 MG tablet, Take 20 mg by mouth Daily., Disp: , Rfl:     Review of Systems   Constitutional: Negative for chills, decreased appetite, fever and weight loss.   HENT: Negative for congestion, nosebleeds and sore throat.    Eyes: Negative for blurred vision, visual disturbance and visual halos.   Cardiovascular: Negative for chest pain, dyspnea on exertion and leg swelling.        Patient does not report signs or symptoms of sternal malunion, denies popping, clicking, shifting, or uncontrolled pain.   Respiratory: Negative for cough, shortness of breath, sputum production and wheezing.    Endocrine: Negative for cold intolerance and polyuria.   Hematologic/Lymphatic: Negative for bleeding problem. Bruises/bleeds easily.        Does not report S/S of adverse bleeding event including nose bleeds, hematuria, melena, gingival bleeding, or hematemesis.   Skin: Negative for  flushing, nail changes and unusual hair distribution.   Musculoskeletal: Positive for arthritis and joint pain. Negative for back pain.   Gastrointestinal: Negative for bloating, abdominal pain, hematemesis, melena, nausea and vomiting.   Genitourinary: Negative for flank pain and hematuria.   Neurological: Positive for weakness. Negative for brief paralysis, difficulty with concentration, focal weakness, light-headedness, loss of balance, numbness and paresthesias.        No amaurosis fugax, TIA, or CVA.     Psychiatric/Behavioral: Negative for altered mental status, depression, substance abuse and suicidal ideas.   Allergic/Immunologic: Negative for hives and persistent infections.     Vitals:    11/10/21 1356   BP: 122/70   Pulse: 89   SpO2: 100%      Objective   Heart Rate:  [89] 89  BP: (122)/(70) 122/70  Body mass index is 27.73 kg/m².  Physical Exam   Constitutional: He is oriented to person, place, and time. He appears well-developed.   HENT:   Head: Normocephalic and atraumatic.   Eyes: Pupils are equal, round, and reactive to light. Conjunctivae are normal.   Cardiovascular: Normal rate and normal heart sounds.   No murmur heard.  Pulmonary/Chest: Effort normal and breath sounds normal. No respiratory distress.   Abdominal: Soft. Bowel sounds are normal. He exhibits no distension.   Genitourinary:    Genitourinary Comments: deferred     Musculoskeletal: Normal range of motion.   Neurological: He is alert and oriented to person, place, and time. No cranial nerve deficit.   No deficits   Skin: Skin is warm and dry. Capillary refill takes less than 2 seconds.   There is no evidence of skin breakdown of the bilateral lower extremities.  BLE pink, no evidence of ischemia.  Feet are absent of dependent rubor.   Psychiatric: His behavior is normal. Judgment normal.   Vitals reviewed.        Assessment & Plan     Independent Review of Studies  11/2021 Carotid Duplex: LISSA 0-49% mPSV 86c/s Ratio 1.9, LICA 50-69%  mPSV 147c/s Ratio 2.3, Antegrade vertebrals    1. Coronary artery disease involving native coronary artery of native heart without angina pectoris  Remains on ASA, Statin, Xarelto  Afib, no reports of angina or equivalents    2. Bilateral carotid artery stenosis  Mild R ICA disease, moderate L ICA disease.   Asymptomatic  Velocities stable over interval imaging.     Repeat duplex in one year    Xarelto, statin, ASA    3. Mixed hyperlipidemia  Lipid-lowering therapy has been proven beneficial in patients with cardio-vascular disease. Current guidelines recommend statin treatment for all patients with PAD,CAD and carotid stenosis. Statins are beneficial in preventing cardiovascular events, increasing functional capacity and lower the risk of adverse limb loss in PAD. Statins decrease the progression of plaque formation and may improve peripheral vessel lining, and aid in reversing atherosclerosis.          Detailed discussion regarding risks, benefits, and treatment plan. Images independently reviewed. Patient understands, agrees, and wishes to proceed with plan.       This document has been electronically signed by Jorge Sofia AGACNP-BC Winnie  On November 10, 2021 15:04 CST

## 2022-11-14 DIAGNOSIS — I65.23 BILATERAL CAROTID ARTERY STENOSIS: Primary | ICD-10-CM
